# Patient Record
Sex: FEMALE | Race: WHITE | Employment: UNEMPLOYED | URBAN - METROPOLITAN AREA
[De-identification: names, ages, dates, MRNs, and addresses within clinical notes are randomized per-mention and may not be internally consistent; named-entity substitution may affect disease eponyms.]

---

## 2024-06-29 ENCOUNTER — HOSPITAL ENCOUNTER (OUTPATIENT)
Age: 39
Discharge: HOME | End: 2024-06-29

## 2024-06-29 VITALS
OXYGEN SATURATION: 100 % | WEIGHT: 130 LBS | BODY MASS INDEX: 19.7 KG/M2 | HEART RATE: 62 BPM | TEMPERATURE: 99 F | HEIGHT: 68 IN | SYSTOLIC BLOOD PRESSURE: 109 MMHG | RESPIRATION RATE: 18 BRPM | DIASTOLIC BLOOD PRESSURE: 77 MMHG

## 2024-06-29 DIAGNOSIS — Z48.02 ENCOUNTER FOR REMOVAL OF SUTURES: Primary | ICD-10-CM

## 2024-06-29 PROCEDURE — 99212 OFFICE O/P EST SF 10 MIN: CPT

## 2024-06-29 RX ORDER — SPIRONOLACTONE 25 MG/1
TABLET ORAL DAILY
COMMUNITY

## 2024-06-29 RX ORDER — DOXYCYCLINE 50 MG/1
50 TABLET ORAL 2 TIMES DAILY
COMMUNITY

## 2024-06-29 NOTE — ED PROVIDER NOTES
Patient Seen in: [facility]      History     Chief Complaint   Patient presents with    Sut Stap RingRemoval            Stated Complaint: Stitch removal  Subjective:   Rosamaria a 39-year-old female presenting to the immediate care requesting a suture removal.  Patient states that she cut her arm 10 days ago and went to the [facility] emergency department for sutures.  Patient has 3 sutures to her left wrist.  Patient denies any pain to the area.  No drainage.  No redness.  Denies any weakness, numbness, tingling.  Patient states she is otherwise feeling well.  She has no other complaints or concerns.        Objective:   History reviewed. No pertinent past medical history.         History reviewed. No pertinent surgical history.           Social History     Socioeconomic History    Marital status:    Tobacco Use    Smoking status: Never    Smokeless tobacco: Never     Social Determinants of Health     Food Insecurity: No Food Insecurity (6/24/2021)    Received from [facility]    Food Insecurity     Within the past 30 days, I worried whether my food would run out before I got money to buy more. / En los últimos 30 días, me preocupó que la comida se podía acabar antes de tener dinero para compr...: Never true / Nunca     Within the past 30 days, the food that I bought just didn't last, and I didn't have money to get more. / En los últimos 30 días, La comida que compré no rindió lo suficiente, y no tenía dinero para...: Never true / Nunca            Review of Systems    Positive for stated complaint: Sut Stap RingRemoval (/)    Other systems are as noted in HPI.  Constitutional and vital signs reviewed.      All other systems reviewed and negative except as noted above.    Physical Exam     ED Triage Vitals [06/29/24 1157]   /77   Pulse 62   Resp 18   Temp 98.5 °F (36.9 °C)   Temp src Temporal   SpO2 100 %   O2 Device None (Room air)     Current:/77   Pulse 62   Temp  98.5 °F (36.9 °C) (Temporal)   Resp 18   Ht 172.7 cm (5' 8\")   Wt 59 kg   SpO2 100%   BMI 19.77 kg/m²     Physical Exam  Vitals and nursing note reviewed.   Constitutional:       General: She is not in acute distress.     Appearance: Normal appearance. She is not ill-appearing, toxic-appearing or diaphoretic.   HENT:      Head: Normocephalic.   Cardiovascular:      Rate and Rhythm: Normal rate.   Pulmonary:      Effort: Pulmonary effort is normal.   Musculoskeletal:         General: Normal range of motion.      Cervical back: Normal range of motion.   Skin:     General: Skin is warm and dry.      Capillary Refill: Capillary refill takes less than 2 seconds.      Findings: Wound present.      Comments: Sutured wound to the right lateral wrist that is well-healing without drainage or erythema.  CMS is intact distally.  2+ radial and ulnar pulses.   Neurological:      General: No focal deficit present.      Mental Status: She is alert and oriented to person, place, and time.   Psychiatric:         Mood and Affect: Mood normal.         Behavior: Behavior normal.         Thought Content: Thought content normal.         Judgment: Judgment normal.         ED Course   Radiology:  No results found.  Labs Reviewed - No data to display    MDM     Medical Decision Making  Differential diagnoses reflecting the complexity of care include but are not limited to suture removal, wound infection.    Comorbidities that add complexity to management include: None  History obtained by an independent source was from: Patient  Patient is well appearing, non-toxic and in no acute distress.  Vital signs are stable.   Patient presents for suture removal.  Wound is well-healing without any signs of infections.  Sutures were removed without incident.  Recommended that patient use bacitracin twice a day and keep the wound clean and dry.  Wound infection precautions given.  Recommended the patient follow-up with his primary care doctor.  ED  precautions discussed.  Patient (guardian) advised to follow up with PCP in 2-3 days.  Patient (guardian) agrees with this plan of care.  Patient (guardian) verbalizes understanding of discharge instructions and plan of care.      Risk  OTC drugs.        Disposition and Plan     Clinical Impression:  1. Encounter for removal of sutures         Disposition:  Discharge  6/29/2024 12:07 pm    Follow-up:  Allen Cavazos MD  [address] [place] [address]  [phone]                Medications Prescribed:  Discharge Medication List as of 6/29/2024 12:09 PM

## 2024-06-29 NOTE — DISCHARGE INSTRUCTIONS
Please wash the area multiple times a day and soap and water.  Keep a small amount of antibiotic ointment on the wound twice a day.  Keep it covered while you are at work until it is completely healed.  Watch for signs of infection including redness, swelling, drainage and if any of those things occur you should go to the emergency department.  Otherwise follow-up with your primary care doctor as needed.

## 2024-06-29 NOTE — ED INITIAL ASSESSMENT (HPI)
Pt presents to the IC with c/o needing sutures removed from the right wrist,placed at [facility] on 6/19. No drainage, fevers, swelling or redness.